# Patient Record
Sex: FEMALE | Race: BLACK OR AFRICAN AMERICAN | NOT HISPANIC OR LATINO | URBAN - NONMETROPOLITAN AREA
[De-identification: names, ages, dates, MRNs, and addresses within clinical notes are randomized per-mention and may not be internally consistent; named-entity substitution may affect disease eponyms.]

---

## 2021-09-01 ENCOUNTER — IMPORTED ENCOUNTER (OUTPATIENT)
Dept: URBAN - NONMETROPOLITAN AREA CLINIC 1 | Facility: CLINIC | Age: 51
End: 2021-09-01

## 2021-09-01 PROBLEM — H52.4: Noted: 2021-09-01

## 2021-09-01 PROBLEM — H52.13: Noted: 2021-09-01

## 2021-09-01 PROBLEM — H25.813: Noted: 2021-09-01

## 2021-09-01 PROCEDURE — S0620 ROUTINE OPHTHALMOLOGICAL EXA: HCPCS

## 2021-09-01 NOTE — PATIENT DISCUSSION
Presbyopia-Discussed diagnosis with patient. Updated spec Rx given. Recommend lens that will provide comfort as well as protect safety and health of eyes. Cataract OU-Not yet surgical. -Reviewed symptoms of advancing cataract growth such as glare and halos and decreased vision.-Continue to monitor for now. Pt will notify us if any new symptoms develop.

## 2021-10-28 NOTE — PATIENT DISCUSSION
Patient is here for suture removal.  The patient has a normal amount of bruising and swelling consistent with the post operative course.  The suture lines are intact, there is no evidence of infection.  The plan is to remove the sutures today with an expectation of normal healing.  The post op course has been further reviewed with the patient. Sutures removed today without difficulty. Resume normal activity. Artificial tears prn burning/itching/blurry vision. Wash incisions/ lash line once daily with baby shampoo.

## 2022-04-15 ASSESSMENT — VISUAL ACUITY
OS_CC: 20/30
OD_CC: 20/20

## 2022-04-15 ASSESSMENT — TONOMETRY
OS_IOP_MMHG: 21
OD_IOP_MMHG: 22

## 2022-09-02 ENCOUNTER — ESTABLISHED PATIENT (OUTPATIENT)
Dept: RURAL CLINIC 2 | Facility: CLINIC | Age: 52
End: 2022-09-02

## 2022-09-02 DIAGNOSIS — H25.813: ICD-10-CM

## 2022-09-02 DIAGNOSIS — H52.13: ICD-10-CM

## 2022-09-02 DIAGNOSIS — H52.4: ICD-10-CM

## 2022-09-02 PROCEDURE — 92015 DETERMINE REFRACTIVE STATE: CPT

## 2022-09-02 PROCEDURE — 92014 COMPRE OPH EXAM EST PT 1/>: CPT

## 2022-09-02 ASSESSMENT — TONOMETRY
OD_IOP_MMHG: 17
OS_IOP_MMHG: 17

## 2022-09-02 ASSESSMENT — VISUAL ACUITY
OD_SC: 20/25
OS_SC: 20/20

## 2024-11-18 NOTE — PATIENT DISCUSSION
Patient is continuing to heal well.  Sutures were removed today without difficulty.  Recommend lubrication w/ AT drops and gels as needed.  Follow up prn. no edema,  no murmurs,  regular rate and rhythm